# Patient Record
(demographics unavailable — no encounter records)

---

## 2019-01-01 NOTE — HP
Information from Mother's Record: 





   Previous Pregnancy/Births





Maternal Age                     34


Grav                             8


Para                             6


SAB                              1


IEA                              0


LC                               6


Maternal Blood Type and Rh       O Positive





Testing Needs/Results





Gestational Age in Weeks and     36 Weeks and 0 Days


Days                             


Determined By                    LMP


Violence or Abuse During this    No


Pregnancy                        


Feeding Plan                     Breast


Planned Infant Care Provider     [facility]


Post-Discharge                   


Serology/RPR Result              Non-Reactive


Rubella Result                   Non-Immune


HBsAg Result                     Negative


HIV Result                       Negative


GBS Culture Result               Negative








Significant Medical History





Hx Diabetes                      No


Hx Thyroid Disease               No


Hx Hypertension                  No


Hx Asthma                        No


Hx Preeclampsia                  Yes: this pregnancy


Hx Kidney Infection              Yes: kidney stone history


Hx  Section              No


Hx Other Reproductive            Yes: Left oophorectomy/salpingectomy 2011


Disorders/Problems               


Other Pertinent Medical          migraines, kidney stones


History                          





Tobacco/Alcohol/Substance Use





Smoking Status (MU)              Never Smoked Tobacco


Household Exposure               No


Alcohol Use                      None


Substance Use Type               None





Delivery Information/Events of Note





Date of Birth [A]                19


Time of Birth [A]                22:00


Delivery Method [A]              Spontaneous Vaginal


Labor [A]                        Induced


Amniotic Fluid [A]               Clear


Anesthesia/Analgesia [A]         CEI for Labor


Level of Nursery                 Regular/Bedside


Delivery Events of Note          Pitocin During Labor,Mag Sulfate Given














Delivery Events


Date of Birth: 19


Time of Birth: 22:00


Apgar Score 1 Minute: 9


Apgar Score 5 Minutes: 9


Gestational Age Weeks: 36


Gestational Age Days: 0


Delivery Type: Vaginal


Amniotic Fluid: Clear


Intrapartal Antibiotics Indicated: None Apply


Other GBS Status Detail: GBS Negative This Pregnancy


ROM Length: ROM < 18 Hours


Antibiotic Treatment: No Antibx, or ANY Antibx Given < 2hrs Prior to Delivery


Hepatitis B Vaccine: Given Within 12 Hours


Immunoglobulin Given: No


 Drug Withdrawal Risk: None Apply


Hepatitis B Status/Risk: Mother HBsAg NEGATIVE With No New Risk Factors


Maternal Consent: Mother CONSENTS To Infant Hepatitis Vaccine +/- HBIG


Other Risk Factors & History: None


Additional Identified Prenatal/Delivery Events of Concern: 36 wks





Hypoglycemia Assessment


Hypoglycemia Risk - High: Gestational Age between 34 wks and 36 wks and 6 days


Hypoglycemia Symptoms: None


Chemstrip Protocol: Chemstrips Indicated





Nutrition and Output





- Nutrition


Method of Feeding: Breast feeding


Formula: Enfamil Lipil


Feeding Frequency: Every 2-3 Hours





- Stool


Stool Passed: No





- Voiding


Voiding: Yes





Measurements


Current Weight: 2.52 kg


Birth Weight: 2.52 kg


Birthweight in lbs and ozs: 5 lbs and 9 oz


Length: 7.48 in


Head Circumference in inches: 13


Abdominal Girth in cm: 27.5


Abdominal Girth in inches: 10.827





Vitals


Vital Signs: 


 Vital Signs











  19





  21:30 23:30 00:44


 


Temperature 98.1 F 97.9 F 98.0 F


 


Pulse Rate 136 122 


 


Respiratory 44 36 





Rate   














  19





  01:47 02:39 07:26


 


Temperature 98.8 F 97.9 F 98.1 F


 


Pulse Rate 124 112 132


 


Respiratory 48 32 40





Rate   














[place] Physical Exam


General Appearance: Alert, Active


Skin Color: Normal


Level of Distress: No Distress


Nutritional Status: AGA


Cranial Features: Normal head shape, Symmetric facial features, Normal 

fontanelles


Eyes: Bilateral Normal, Bilateral Red Reflex


Ears: Symmetrical, Normal Position, Canals Patent


Oropharynx: Normal: Lips, Mouth, Gums, Uvula


Neck: Normal Tone


Respiratory Effort: Normal


Respiratory Rate: Normal


Chest Appearance: Normal, Areola Breast 3-4 mm Size, Symmetrical


Auscultation: Bilateral Good Air Exchange


Breath Sounds: NL Both Lungs


Location of Apical Pulse: Normal


Rhythm: Regular


Heart Sounds: Normal: S1, S2


Abnormal Heart Sounds: No Murmurs, No S3, No S4


Brachial Pulses: Bilateral Normal


Femoral Pulses: Bilateral Normal


Umbilicus Assessment: Yes Normal


Abdomen: Normal


Abdomen Palpation: Liver Normal, Spleen Normal


Hernia: None


Anus: Patent


Location of Anus: Normal


Genital Appearance: Male


Enlarged Nodes: None


Penis: Normal


Meatal Location: Tip of Glans


Scrotal Skin: Rugae Normal for GA


Scrotal Mass: Bilateral None


Testes: Bilateral Normal


Clavicles: Normal


Arms: 2 Symmetrical Extremities, Full Range of Motion


Hands: 2 Hands, Symmetrical, 5 Fingers on Each Hand, Full Range of Motion


Left Hip: Normal ROM


Right Hip: Normal ROM


Legs: 2 Symmetrical Extremities, Full Range of Motion


Feet: 2 Feet, Symmetrical, Creases on 2/3 of Soles, Full Range of Motion


Spine: Normal


Skin Texture: Smooth, Soft


Skin Appearance: No Abnormalities


Neuro: Normal: Adrianne, Sucking, Muscle Tone


Cranial Nerve Exam: Cranial N. II-XII Normal


Deep Tendon Reflexes: Normal: Bicep, Knee, Ankle





Medications


Home Medications: 


 Home Medications











 Medication  Instructions  Recorded  Confirmed  Type


 


NK [No Home Medications Reported]  19 History











Inpatient Medications: 


 Medications





Dextrose (Glutose Oral Nicu*)  0 ml BUCCAL .SEE MD INSTRUCTIONS PRN; Protocol


   PRN Reason: ASYMTOMATIC HYPOGLYCEMIA


   Last Admin: 19 00:56  Dose: 1.25 ml











Results/Investigations


Lab Results: 


 











  19





  22:00 22:00 23:48


 


POC Glucose (mg/dL)    36 L*


 


Total Bilirubin  2.80  


 


Blood Type   O Positive 


 


Direct Antiglob Test   Negative 














  19





  00:37 01:43 02:46


 


POC Glucose (mg/dL)  36 L*  59  44 L


 


Total Bilirubin   


 


Blood Type   


 


Direct Antiglob Test   














  19





  04:20 05:13


 


POC Glucose (mg/dL)  52  49 L


 


Total Bilirubin  


 


Blood Type  


 


Direct Antiglob Test  














Assessment





- Status


Status: Pre-term, AGA


Condition: Stable


Assessment: 





36 wk AGA male infant born via induced vaginal delivery for maternal 

preeclampsia to a 35 yo ->7 mother with normal prenatal labs except for 

rubella nonimmune.  MBT O+/BBT O+ VICKY neg.  








Plan of Care


 Admission to:  Nursery


Plan of Care: 





hypoglycemic protocol - initial bld glucose low responded well to formula feed. 

continue monitoring as per protocol. breastfeeding with formula supplement as 

needed. 


Provided Guidance to: Mother


Guidance and Instruction: signs of illness, feeding schedule/plan, signs of 

jaundice

## 2019-01-01 NOTE — DS
Prenatal Information: 





   Previous Pregnancy/Births





Maternal Age                     34


Grav                             8


Para                             6


SAB                              1


IEA                              0


LC                               6


Maternal Blood Type and Rh       O Positive





Testing Needs/Results





Gestational Age in Weeks and     36 Weeks and 0 Days


Days                             


Determined By                    LMP


Violence or Abuse During this    No


Pregnancy                        


Feeding Plan                     Breast


Planned Infant Care Provider     [facility]


Post-Discharge                   


Serology/RPR Result              Non-Reactive


Rubella Result                   Non-Immune


HBsAg Result                     Negative


HIV Result                       Negative


GBS Culture Result               Negative








Significant Medical History





Hx Diabetes                      No


Hx Thyroid Disease               No


Hx Hypertension                  No


Hx Asthma                        No


Hx Preeclampsia                  Yes: this pregnancy


Hx Kidney Infection              Yes: kidney stone history


Hx  Section              No


Hx Other Reproductive            Yes: Left oophorectomy/salpingectomy 2011


Disorders/Problems               


Other Pertinent Medical          migraines, kidney stones


History                          





Tobacco/Alcohol/Substance Use





Smoking Status (MU)              Never Smoked Tobacco


Household Exposure               No


Alcohol Use                      None


Substance Use Type               None





Delivery Information/Events of Note





Date of Birth [A]                19


Time of Birth [A]                22:00


Delivery Method [A]              Spontaneous Vaginal


Labor [A]                        Induced


Amniotic Fluid [A]               Clear


Anesthesia/Analgesia [A]         CEI for Labor


Level of Nursery                 Regular/Bedside


Delivery Events of Note          Pitocin During Labor,Mag Sulfate Given














Delivery Events


Date of Birth: 19


Time of Birth: 22:00


Apgar Score 1 Minute: 9


Apgar Score 5 Minutes: 9


Gestational Age Weeks: 36


Gestational Age Days: 0


Delivery Type: Vaginal


Amniotic Fluid: Clear


Intrapartal Antibiotics Indicated: None Apply


Other GBS Status Detail: GBS Negative This Pregnancy


ROM Length: ROM < 18 Hours


Antibiotic Treatment: No Antibx, or ANY Antibx Given < 2hrs Prior to Delivery


Hepatitis B Vaccine: Given Within 12 Hours


Immunoglobulin Given: No


 Drug Withdrawal Risk: None Apply


Hepatitis B Status/Risk: Mother HBsAg NEGATIVE With No New Risk Factors


Maternal Consent: Mother CONSENTS To Infant Hepatitis Vaccine +/- HBIG


Other Risk Factors & History: None


Additional Identified Prenatal/Delivery Events of Concern: 36 wks


Date of Service: 06/10/19


Interval History: 





36 week preemie born via induced vaginal delivery for maternal preeclampsia, 

initial hypoglycemia responded well to breastfeeding with PBM supplementation. 

Developed hyperbilirubinemia on DOL2 to high risk zone, no set up , afebrile. 

Phototx was initiated.  This am T bili in low int risk zone,. wt loss of 8% and 

breastfeeding well. good uo and stooling.  Phototx d/cd . rebound bili pending. 





Measurements


Current Weight: 2.32 kg


Weight in lbs and ozs: 5 lbs and 2 oz


Weight Yesterday: 2.385 kg


Weight Gain/Loss Since Last Weight In Grams: 65.0 Loss


Birth Weight: 2.52 kg


Birthweight in lbs and ozs: 5 lbs and 9 oz


% Weight Gain/Loss from Birth Weight: 8% Loss


Length: 7.48 in


Head Circumference in inches: 13


Abdominal Girth in cm: 27.5


Abdominal Girth in inches: 10.827





Vitals


Vital Signs: 


 Vital Signs











  06/09/19 06/09/19 06/10/19





  15:30 21:13 00:00


 


Temperature 99.2 F 98.6 F 98.4 F


 


Pulse Rate 140 108 120


 


Respiratory 36 38 32





Rate   














  06/10/19 06/10/19





  06:00 12:26


 


Temperature 99.3 F 97.3 F


 


Pulse Rate 160 140


 


Respiratory 44 36





Rate  














 Physical Exam


General Appearance: Alert, Active


Skin Color: Jaundiced


Level of Distress: No Distress


Neck: Normal Tone


Respiratory Effort: Normal


Respiratory Rate: Normal


Auscultation: Bilateral Good Air Exchange


Breath Sounds: NL Both Lungs


Rhythm: Regular


Abnormal Heart Sounds: No Murmurs, No S3, No S4


Umbilicus Assessment: Yes Normal


Abdomen: Normal


Abdomen Palpation: Liver Normal, Spleen Normal


Penis: Normal


Clavicles: Normal


Left Hip: Normal ROM


Right Hip: Normal ROM


Skin Texture: Smooth, Soft


Skin Appearance: No Abnormalities


Neuro: Normal: Adrianne, Sucking, Muscle Tone


Cranial Nerve Exam: Cranial N. II-XII Normal





Medications


Home Medications: 


 Home Medications











 Medication  Instructions  Recorded  Confirmed  Type


 


NK [No Home Medications Reported]  19 History











Inpatient Medications: 


 Medications





Dextrose (Glutose Oral Nicu*)  0 ml BUCCAL .SEE MD INSTRUCTIONS PRN; Protocol


   PRN Reason: ASYMTOMATIC HYPOGLYCEMIA


   Last Admin: 19 00:56  Dose: 1.25 ml











Results/Investigations


Transcutaneous Bilirubin Result: 11


Time Obtained: 08:07


Age in Hours: 57


Risk Zone: Low Intermediate Risk


Bilirubin Comment:  repeat this am 9.6


Major Jaundice Risk Factors: None


Minor Jaundice Risk Factors: Breastfeeding, Mother > 24 yrs old


Decreased Jaundice Risk: Bili in low risk zone


CCHD Screen: Passed


Lab Results: 


 











  19





  22:00 22:00 22:00


 


POC Glucose (mg/dL)   


 


Total Bilirubin  2.80  


 


Direct Bilirubin   


 


Indirect Bilirubin   


 


RPR   Nonreactive 


 


Blood Type    O Positive


 


Direct Antiglob Test    Negative














  19





  23:48 00:37 01:43


 


POC Glucose (mg/dL)  36 L*  36 L*  59


 


Total Bilirubin   


 


Direct Bilirubin   


 


Indirect Bilirubin   


 


RPR   


 


Blood Type   


 


Direct Antiglob Test   














  19





  02:46 04:20 05:13


 


POC Glucose (mg/dL)  44 L  52  49 L


 


Total Bilirubin   


 


Direct Bilirubin   


 


Indirect Bilirubin   


 


RPR   


 


Blood Type   


 


Direct Antiglob Test   














  19





  08:51 12:04 15:45


 


POC Glucose (mg/dL)  51  57  47 L


 


Total Bilirubin   


 


Direct Bilirubin   


 


Indirect Bilirubin   


 


RPR   


 


Blood Type   


 


Direct Antiglob Test   














  19





  18:17 21:27 08:58


 


POC Glucose (mg/dL)  48 L  57 


 


Total Bilirubin    11.30  D


 


Direct Bilirubin    0.30 H


 


Indirect Bilirubin    11.0 H


 


RPR   


 


Blood Type   


 


Direct Antiglob Test   














  06/10/19





  06:20


 


POC Glucose (mg/dL) 


 


Total Bilirubin  9.60  D


 


Direct Bilirubin 


 


Indirect Bilirubin 


 


RPR 


 


Blood Type 


 


Direct Antiglob Test 














Hospital Course


Hearing Screen: Passed Both


Left Ear: Passed, TEOAE


Right Ear: Passed, TEOAE


Hepatitis B Vaccine: Given Within 12 Hours


Date Given: 19


[place] Scre[place]: Done





Assessment





- Assessment


Condition at Discharge: Improved


Discharge Disposition: Home - pending rebound bili result


Diagnosis at Discharge:  36 week preemie, hyperbilirubinemia





Plan





- Follow Up Care


Follow Up Care Provider: [facility]


Follow up date: 19


Appointment Status: Office Will Call





- Anticipatory Guidance/Instruction


Provided Guidance to: Mother


Guidance and Instruction: signs of illness, feeding schedule/plan, signs of 

jaundice, sleeping position, limit exposure to others


Discharge Comments: 





discharge to home with follow up in the office tomorrow if T bili <14 this 

afternoon

## 2019-01-01 NOTE — PN
Date of Service: 19


Method of Feeding: Breast feeding


Feeding Frequency: Ad Joya


Feeding Status: Without Difficulty


Stool Passed: Yes


Voiding: Yes





Measurements


Current Weight: 2.385 kg


Weight in lbs and ozs: 5 lbs and 4 oz


Weight Yesterday: 2.52 kg


Weight Gain/Loss Since Last Weight In Grams: 135.0 Loss


Birth Weight: 2.52 kg


Birthweight in lbs and ozs: 5 lbs and 9 oz


% Weight Gain/Loss from Birth Weight: 5% Loss


Length: 7.48 in


Head Circumference in inches: 13


Abdominal Girth in cm: 27.5


Abdominal Girth in inches: 10.827





Vitals


Vital Signs: 


 Vital Signs











  19





  22:33 01:00 04:21


 


Temperature 98.4 F 98.7 F 98.0 F


 


Pulse Rate 130 130 


 


Respiratory 40 40 





Rate   














  19





  08:08 11:37 15:30


 


Temperature 98.1 F 98.3 F 99.2 F


 


Pulse Rate 140 128 140


 


Respiratory 44 30 36





Rate   














  19





  21:13


 


Temperature 98.6 F


 


Pulse Rate 108


 


Respiratory 38





Rate 














 Physical Exam


General Appearance: Alert, Active


Skin Color: Jaundiced - to level of chest


Level of Distress: No Distress


Nutritional Status: AGA


Cranial Features: Normal head shape


Respiratory Effort: Normal


Respiratory Rate: Normal


Auscultation: Bilateral Good Air Exchange


Breath Sounds: NL Both Lungs


Rhythm: Regular


Abnormal Heart Sounds: No Murmurs, No S3, No S4





Medications


Home Medications: 


 Home Medications











 Medication  Instructions  Recorded  Confirmed  Type


 


NK [No Home Medications Reported]  19 History











Inpatient Medications: 


 Medications





Dextrose (Glutose Oral Nicu*)  0 ml BUCCAL .SEE MD INSTRUCTIONS PRN; Protocol


   PRN Reason: ASYMTOMATIC HYPOGLYCEMIA


   Last Admin: 19 00:56  Dose: 1.25 ml











Results/Investigations


Transcutaneous Bilirubin Result: 11


Time Obtained: 08:07


Age in Hours: 36


Risk Zone: High Risk


Bilirubin Comment: 11.3


Major Jaundice Risk Factors: Bili in high risk zone


CCHD Screen: Passed


Lab Results: 


 











  19





  22:00 22:00 22:00


 


POC Glucose (mg/dL)   


 


Total Bilirubin  2.80  


 


Direct Bilirubin   


 


Indirect Bilirubin   


 


RPR   Nonreactive 


 


Blood Type    O Positive


 


Direct Antiglob Test    Negative














  19





  23:48 00:37 01:43


 


POC Glucose (mg/dL)  36 L*  36 L*  59


 


Total Bilirubin   


 


Direct Bilirubin   


 


Indirect Bilirubin   


 


RPR   


 


Blood Type   


 


Direct Antiglob Test   














  19





  02:46 04:20 05:13


 


POC Glucose (mg/dL)  44 L  52  49 L


 


Total Bilirubin   


 


Direct Bilirubin   


 


Indirect Bilirubin   


 


RPR   


 


Blood Type   


 


Direct Antiglob Test   














  19





  08:51 12:04 15:45


 


POC Glucose (mg/dL)  51  57  47 L


 


Total Bilirubin   


 


Direct Bilirubin   


 


Indirect Bilirubin   


 


RPR   


 


Blood Type   


 


Direct Antiglob Test   














  19





  18:17 21:27 08:58


 


POC Glucose (mg/dL)  48 L  57 


 


Total Bilirubin    11.30  D


 


Direct Bilirubin    0.30 H


 


Indirect Bilirubin    11.0 H


 


RPR   


 


Blood Type   


 


Direct Antiglob Test   











Condition: Stable


Assessment: 





36 wk AGA male infant born via induced vaginal delivery for maternal 

preeclampsia to a 33 yo ->7 mother with normal prenatal labs except for 

rubella nonimmune.  MBT O+/BBT O+ VICKY neg. initially hypoglycemic - responded 

well to bm feeds and formula supplementation. This am with jaundice in high 

risk zone. Phototherapy initiated.  Baby is feeding well. +stool/void.  5% wt 

loss. 


Plan of Care: 





Routine care


double phototherapy for physiological hyperbilirubinemia and prematurity.  

Repeat Tbili in am./


siblings with h/o jaundice. 





Provided Guidance to: Mother, Father


Guidance and Instruction: signs of illness, feeding schedule/plan, signs of 

jaundice